# Patient Record
Sex: MALE | Race: WHITE | URBAN - METROPOLITAN AREA
[De-identification: names, ages, dates, MRNs, and addresses within clinical notes are randomized per-mention and may not be internally consistent; named-entity substitution may affect disease eponyms.]

---

## 2017-01-11 ENCOUNTER — TELEPHONE (OUTPATIENT)
Dept: SURGERY | Facility: CLINIC | Age: 42
End: 2017-01-11

## 2017-01-11 ENCOUNTER — DOCUMENTATION ONLY (OUTPATIENT)
Dept: SURGERY | Facility: CLINIC | Age: 42
End: 2017-01-11

## 2017-01-11 ENCOUNTER — OFFICE VISIT (OUTPATIENT)
Dept: SURGERY | Facility: CLINIC | Age: 42
End: 2017-01-11

## 2017-01-11 VITALS
HEART RATE: 84 BPM | HEIGHT: 77 IN | SYSTOLIC BLOOD PRESSURE: 112 MMHG | DIASTOLIC BLOOD PRESSURE: 76 MMHG | WEIGHT: 238.4 LBS | OXYGEN SATURATION: 98 % | BODY MASS INDEX: 28.15 KG/M2

## 2017-01-11 DIAGNOSIS — Z12.11 SCREENING FOR COLORECTAL CANCER: Primary | ICD-10-CM

## 2017-01-11 DIAGNOSIS — Z12.12 SCREENING FOR COLORECTAL CANCER: Primary | ICD-10-CM

## 2017-01-11 ASSESSMENT — PAIN SCALES - GENERAL: PAINLEVEL: NO PAIN (0)

## 2017-01-11 NOTE — TELEPHONE ENCOUNTER
Per task, I called Justin and set up a follow up appointment with lab appointment. I confirmed time, date, location, prep and provider.

## 2017-01-11 NOTE — Clinical Note
2017       RE: Justin Cortez  [address]   [facility] [address]     Dear Colleague,    Thank you for referring your patient, Justin Cortez, to the COLON AND RECTAL SURGERY at [facility]. Please see a copy of my visit note below.    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  Given these findings they were subsequently sent to the Colon and Rectal Surgery Clinic for an opinion on this and a new patient consultation.     History of Present Ilness: The patient is overall a good historian but we do not have formal records at the time of this visit. The history that follows is therefore entirely from the patient. Patient underwent a 2 stage total proctocolectomy with ileal pouch anal anastomosis in . This was done at [facility] by Dr. Mota. He did well following this and overall reports very good function. He may have had a pouchoscopy once in the past 17 years and saw a gastrointestinal doctor about 8 years ago in [place] once, as well. But overall he has not had much in the way of medical care. He has about 6 bowel movements daily and about once nightly. He has some fecal incontinence (a smear) about once or twice per week overnight. He takes no imodium, has had no pouchitis, no small bowel obstruction, and overall feels well. He denies skin, eye, and joint manifestations. He is unsure if there was ever any bile duct issues. He states that about a week ago he had noted a small amount of tissue - possibly a polyp or lump outside the anus. He believes that this might be  better now. He states that infrequently (about monthly) he will have a small amount blood in the bowel movement but then this will be self-limited.     PLEASE SEE NOTE BELOW FOR PHYSICAL EXAMINATION, REVIEW OF SYSTEMS, AND OTHER HISTORY.      Assessment/Plan:  41 year old male with a significant past medical history of ulcerative colitis and restorative total proctocolectomy with ileal pouch anal anastomosis here for concerns of a potential anal polyp and need for pelvic pouch surveillance.     1- We discussed surveillance options. He needs a flexible pouchoscopy as a baseline and an examination of the anal area. We would plan to take biopsies of any lesions and the anal transition zone. I offered this to him today in clinic. He has to leave to get to another appointment for his daughter. We will reschedule him for this and he will administer enemas prior to arrival.    2- We will check a complete blood count and complete metabolic panel to assess for anemia (he is at risk with no colon for Vitamin B12 deficiency and resultant anemia) and any liver function test abnormalities especially since we do not have an understanding if primary sclerosing cholangitis was ever an issue and there are increased risks of colorectal dysplasia and need for monitoring.    3 - Bowel function and mild fecal incontinence. He will try some intermittent imodium for this. He has not tried this and it may help with sleeping and avoiding the fecal incontinence in the night time.    4 - We will obtain his outside records and get them into Epic to have more complete information about him.    Total time was 30 minutes, over 50% was spent counseling the patient.     Total time was 30 minutes, over 50% was spent counseling the patient.     PLEASE SEE NOTE BELOW FOR PHYSICAL EXAMINATION, REVIEW OF SYSTEMS, AND OTHER HISTORY.    Zeny Rodriguez MD  Colon and Rectal Surgery Attending  Department of Surgery  [facility]  [facility]      -------------------------------------------------------------------------------------------------------------------    Review of Systems     Constitutional:  Negative for fever, chills, weight loss, weight gain, fatigue, decreased appetite, night sweats, recent stressors, height gain, height loss, post-operative complications, incisional pain, hallucinations, increased energy, hyperactivity and confused.   HENT:  Negative for ear pain, hearing loss, tinnitus, nosebleeds, trouble swallowing, hoarse voice, mouth sores, sore throat, ear discharge, tooth pain, gum tenderness, taste disturbance, smell disturbance, hearing aid, bleeding gums, dry mouth, sinus pain, sinus congestion and neck mass.    Eyes:  Negative for double vision, pain, redness, eye pain, decreased vision, eye watering, eye bulging, eye dryness, flashing lights, spots, floaters, strabismus, tunnel vision, jaundice and eye irritation.   Respiratory:   Negative for cough, hemoptysis, sputum production, shortness of breath, wheezing, sleep disturbances due to breathing, snores loudly, respiratory pain, dyspnea on exertion, cough disturbing sleep and postural dyspnea.    Cardiovascular:  Negative for chest pain, dyspnea on exertion, palpitations, orthopnea, claudication, leg swelling, fingers/toes turn blue, hypertension, hypotension, syncope, history of heart murmur, chest pain on exertion, chest pain at rest, pacemaker, few scattered varicosities, leg pain, sleep disturbances due to breathing, tachycardia, light-headedness, exercise intolerance and edema.   Gastrointestinal:  Negative for heartburn, nausea, vomiting, abdominal pain, diarrhea, constipation, blood in stool, melena, rectal pain, bloating, hemorrhoids, bowel incontinence, jaundice, rectal bleeding, coffee ground emesis and change in stool.   Genitourinary:  Negative for bladder incontinence, dysuria, urgency, hematuria, flank pain, difficulty urinating, nocturia, voiding  less frequently, scrotal pain, ulcerations, penile discharge, male genitourinary complaint and reduced libido.   Musculoskeletal:  Negative for myalgias, back pain, joint swelling, arthralgias, stiffness, muscle cramps, neck pain, bone pain, muscle weakness and fracture.   Skin:  Negative for nail changes, itching, poor wound healing, rash, hair changes, skin changes, acne, warts, poor wound healing, scarring, flaky skin, Raynaud's phenomenon, sensitivity to sunlight and skin thickening.   Neurological:  Negative for dizziness, tingling, tremors, speech change, seizures, loss of consciousness, weakness, light-headedness, numbness, headaches, disturbances in coordination, extremity numbness, memory loss, difficulty walking and paralysis.   Endo/Heme:  Negative for anemia, swollen glands and bruises/bleeds easily.   Psychiatric/Behavioral:  Negative for depression, hallucinations, memory loss, decreased concentration, mood swings and panic attacks.    Endocrine:  Negative for altered temperature regulation, polyphagia, polydipsia, unwanted hair growth and change in facial hair.          Medical history:  Past Medical History   Diagnosis Date     Obstructive sleep apnea      Ulcerative colitis (H)      treated with TPC with IPAA in 2000       Surgical history:  Past Surgical History   Procedure Laterality Date     Total proctocolectomy with ileal pouch anal anastomosis with pelvic ileal pouch.  2000       Problem list:    Patient Active Problem List    Diagnosis Date Noted     PETE (obstructive sleep apnea) 07/10/2015     Priority: Medium     very mild, positional PETE, overall AHI 5.3/hr         Medications:  Current Outpatient Prescriptions   Medication Sig Dispense Refill     Sertraline HCl (ZOLOFT PO) Take 100 mg by mouth daily         Allergies:  No Known Allergies    Family history:  Family History   Problem Relation Age of Onset     DIABETES No family hx of      Hypertension No family hx of      Coronary Artery  "Disease No family hx of      CEREBROVASCULAR DISEASE No family hx of        Social history:  Social History     Social History     Marital Status:      Spouse Name: N/A     Number of Children: N/A     Years of Education: N/A     Occupational History     Not on file.     Social History Main Topics     Smoking status: Never Smoker      Smokeless tobacco: Not on file     Alcohol Use: Yes      Comment: a few times per year     Drug Use: No     Sexual Activity: Not on file     Other Topics Concern     Not on file     Social History Narrative         Nursing Notes:   Avni Lentz CMA  1/11/2017  8:15 AM  Signed  Chief Complaint   Patient presents with     Consult     Rectal Polyps       Filed Vitals:    01/11/17 0812   BP: 112/76   Pulse: 84   Height: 6' 5\"   Weight: 238 lb 6.4 oz   SpO2: 98%       Body mass index is 28.26 kg/(m^2).      LILLIANA Mims Barbara A, RN  1/11/2017  9:23 AM  Signed  Pt will have CMP, CBC when he comes in again for flexible pouchoscopy.  Shavon will call pt to schedule.    Alva SMITH RNCC     Physical Examination:  /76 mmHg  Pulse 84  Ht 6' 5\"  Wt 238 lb 6.4 oz  BMI 28.26 kg/m2  SpO2 98%  General: Pleasant, no acute distress   HEENT: Anicteric sclerae, mucous membranes moist, neck supple.   Abdomen: Soft, nontender, nondistended. No hepatosplenomegaly, no masses. Intact midline and right lower quadrant ileostomy takedown site fascia.  Extremities: No edema in bilateral lower extremities   Perianal external examination:  Deferred (see assessment and plan).    Again, thank you for allowing me to participate in the care of your patient.      Sincerely,    Zeny Rodriguez MD      "

## 2017-01-11 NOTE — NURSING NOTE
Pt will have CMP, CBC when he comes in again for flexible pouchoscopy.  Shavon will call pt to schedule.    Alva SMITH RNCC

## 2017-01-11 NOTE — PROGRESS NOTES
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  Given these findings they were subsequently sent to the Colon and Rectal Surgery Clinic for an opinion on this and a new patient consultation.     History of Present Ilness: The patient is overall a good historian but we do not have formal records at the time of this visit. The history that follows is therefore entirely from the patient. Patient underwent a 2 stage total proctocolectomy with ileal pouch anal anastomosis in . This was done at [facility] by Dr. Mota. He did well following this and overall reports very good function. He may have had a pouchoscopy once in the past 17 years and saw a gastrointestinal doctor about 8 years ago in [place] once, as well. But overall he has not had much in the way of medical care. He has about 6 bowel movements daily and about once nightly. He has some fecal incontinence (a smear) about once or twice per week overnight. He takes no imodium, has had no pouchitis, no small bowel obstruction, and overall feels well. He denies skin, eye, and joint manifestations. He is unsure if there was ever any bile duct issues. He states that about a week ago he had noted a small amount of tissue - possibly a polyp or lump outside the anus. He believes that this might be better now. He states that infrequently (about monthly) he will have a small amount blood in the bowel movement but then this will be self-limited.     PLEASE SEE NOTE BELOW FOR PHYSICAL EXAMINATION, REVIEW OF SYSTEMS, AND OTHER HISTORY.      Assessment/Plan:  41 year old male with  a significant past medical history of ulcerative colitis and restorative total proctocolectomy with ileal pouch anal anastomosis here for concerns of a potential anal polyp and need for pelvic pouch surveillance.     1- We discussed surveillance options. He needs a flexible pouchoscopy as a baseline and an examination of the anal area. We would plan to take biopsies of any lesions and the anal transition zone. I offered this to him today in clinic. He has to leave to get to another appointment for his daughter. We will reschedule him for this and he will administer enemas prior to arrival.    2- We will check a complete blood count and complete metabolic panel to assess for anemia (he is at risk with no colon for Vitamin B12 deficiency and resultant anemia) and any liver function test abnormalities especially since we do not have an understanding if primary sclerosing cholangitis was ever an issue and there are increased risks of colorectal dysplasia and need for monitoring.    3 - Bowel function and mild fecal incontinence. He will try some intermittent imodium for this. He has not tried this and it may help with sleeping and avoiding the fecal incontinence in the night time.    4 - We will obtain his outside records and get them into Epic to have more complete information about him.    Total time was 30 minutes, over 50% was spent counseling the patient.     Total time was 30 minutes, over 50% was spent counseling the patient.     PLEASE SEE NOTE BELOW FOR PHYSICAL EXAMINATION, REVIEW OF SYSTEMS, AND OTHER HISTORY.    Zeny Rodriguez MD  Colon and Rectal Surgery Attending  Department of Surgery  [facility]      -------------------------------------------------------------------------------------------------------------------    Review of Systems     Constitutional:  Negative for fever, chills, weight loss, weight gain, fatigue, decreased appetite, night sweats, recent  stressors, height gain, height loss, post-operative complications, incisional pain, hallucinations, increased energy, hyperactivity and confused.   HENT:  Negative for ear pain, hearing loss, tinnitus, nosebleeds, trouble swallowing, hoarse voice, mouth sores, sore throat, ear discharge, tooth pain, gum tenderness, taste disturbance, smell disturbance, hearing aid, bleeding gums, dry mouth, sinus pain, sinus congestion and neck mass.    Eyes:  Negative for double vision, pain, redness, eye pain, decreased vision, eye watering, eye bulging, eye dryness, flashing lights, spots, floaters, strabismus, tunnel vision, jaundice and eye irritation.   Respiratory:   Negative for cough, hemoptysis, sputum production, shortness of breath, wheezing, sleep disturbances due to breathing, snores loudly, respiratory pain, dyspnea on exertion, cough disturbing sleep and postural dyspnea.    Cardiovascular:  Negative for chest pain, dyspnea on exertion, palpitations, orthopnea, claudication, leg swelling, fingers/toes turn blue, hypertension, hypotension, syncope, history of heart murmur, chest pain on exertion, chest pain at rest, pacemaker, few scattered varicosities, leg pain, sleep disturbances due to breathing, tachycardia, light-headedness, exercise intolerance and edema.   Gastrointestinal:  Negative for heartburn, nausea, vomiting, abdominal pain, diarrhea, constipation, blood in stool, melena, rectal pain, bloating, hemorrhoids, bowel incontinence, jaundice, rectal bleeding, coffee ground emesis and change in stool.   Genitourinary:  Negative for bladder incontinence, dysuria, urgency, hematuria, flank pain, difficulty urinating, nocturia, voiding less frequently, scrotal pain, ulcerations, penile discharge, male genitourinary complaint and reduced libido.   Musculoskeletal:  Negative for myalgias, back pain, joint swelling, arthralgias, stiffness, muscle cramps, neck pain, bone pain, muscle weakness and fracture.   Skin:   Negative for nail changes, itching, poor wound healing, rash, hair changes, skin changes, acne, warts, poor wound healing, scarring, flaky skin, Raynaud's phenomenon, sensitivity to sunlight and skin thickening.   Neurological:  Negative for dizziness, tingling, tremors, speech change, seizures, loss of consciousness, weakness, light-headedness, numbness, headaches, disturbances in coordination, extremity numbness, memory loss, difficulty walking and paralysis.   Endo/Heme:  Negative for anemia, swollen glands and bruises/bleeds easily.   Psychiatric/Behavioral:  Negative for depression, hallucinations, memory loss, decreased concentration, mood swings and panic attacks.    Endocrine:  Negative for altered temperature regulation, polyphagia, polydipsia, unwanted hair growth and change in facial hair.          Medical history:  Past Medical History   Diagnosis Date     Obstructive sleep apnea      Ulcerative colitis (H)      treated with TPC with IPAA in 2000       Surgical history:  Past Surgical History   Procedure Laterality Date     Total proctocolectomy with ileal pouch anal anastomosis with pelvic ileal pouch.  2000       Problem list:    Patient Active Problem List    Diagnosis Date Noted     PETE (obstructive sleep apnea) 07/10/2015     Priority: Medium     very mild, positional PETE, overall AHI 5.3/hr         Medications:  Current Outpatient Prescriptions   Medication Sig Dispense Refill     Sertraline HCl (ZOLOFT PO) Take 100 mg by mouth daily         Allergies:  No Known Allergies    Family history:  Family History   Problem Relation Age of Onset     DIABETES No family hx of      Hypertension No family hx of      Coronary Artery Disease No family hx of      CEREBROVASCULAR DISEASE No family hx of        Social history:  Social History     Social History     Marital Status:      Spouse Name: N/A     Number of Children: N/A     Years of Education: N/A     Occupational History     Not on file.  "    Social History Main Topics     Smoking status: Never Smoker      Smokeless tobacco: Not on file     Alcohol Use: Yes      Comment: a few times per year     Drug Use: No     Sexual Activity: Not on file     Other Topics Concern     Not on file     Social History Narrative         Nursing Notes:   Avni Lentz CMA  1/11/2017  8:15 AM  Signed  Chief Complaint   Patient presents with     Consult     Rectal Polyps       Filed Vitals:    01/11/17 0812   BP: 112/76   Pulse: 84   Height: 6' 5\"   Weight: 238 lb 6.4 oz   SpO2: 98%       Body mass index is 28.26 kg/(m^2).      LILLIANA Mims Barbara A, RN  1/11/2017  9:23 AM  Signed  Pt will have CMP, CBC when he comes in again for flexible pouchoscopy.  Shavon will call pt to schedule.    Alva SMITH RNCC     Physical Examination:  /76 mmHg  Pulse 84  Ht 6' 5\"  Wt 238 lb 6.4 oz  BMI 28.26 kg/m2  SpO2 98%  General: Pleasant, no acute distress   HEENT: Anicteric sclerae, mucous membranes moist, neck supple.   Abdomen: Soft, nontender, nondistended. No hepatosplenomegaly, no masses. Intact midline and right lower quadrant ileostomy takedown site fascia.  Extremities: No edema in bilateral lower extremities   Perianal external examination:  Deferred (see assessment and plan).  "

## 2017-01-11 NOTE — Clinical Note
Date:January 16, 2017      Patient was self referred, no letter generated. Do not send.        [facility] Information

## 2017-01-11 NOTE — NURSING NOTE
"Chief Complaint   Patient presents with     Consult     Rectal Polyps       Filed Vitals:    01/11/17 0812   BP: 112/76   Pulse: 84   Height: 6' 5\"   Weight: 238 lb 6.4 oz   SpO2: 98%       Body mass index is 28.26 kg/(m^2).      Anvi Lentz CMA                              "

## 2017-01-12 ENCOUNTER — DOCUMENTATION ONLY (OUTPATIENT)
Dept: SURGERY | Facility: CLINIC | Age: 42
End: 2017-01-12

## 2017-01-15 ASSESSMENT — ENCOUNTER SYMPTOMS
JAUNDICE: 0
PANIC: 0
CONSTIPATION: 0
WEAKNESS: 0
LIGHT-HEADEDNESS: 0
NECK PAIN: 0
EYE WATERING: 0
DISTURBANCES IN COORDINATION: 0
SNORES LOUDLY: 0
TREMORS: 0
BRUISES/BLEEDS EASILY: 0
DEPRESSION: 0
RECTAL BLEEDING: 0
HOARSE VOICE: 0
HALLUCINATIONS: 0
MUSCLE WEAKNESS: 0
NUMBNESS: 0
LEG PAIN: 0
DYSPNEA ON EXERTION: 0
DIZZINESS: 0
PALPITATIONS: 0
SYNCOPE: 0
TINGLING: 0
SEIZURES: 0
POSTURAL DYSPNEA: 0
HEADACHES: 0
RESPIRATORY PAIN: 0
DIARRHEA: 0
COUGH DISTURBING SLEEP: 0
SINUS CONGESTION: 0
BACK PAIN: 0
TASTE DISTURBANCE: 0
SORE THROAT: 0
SHORTNESS OF BREATH: 0
RECTAL PAIN: 0
LOSS OF CONSCIOUSNESS: 0
INSOMNIA: 0
SPUTUM PRODUCTION: 0
SWOLLEN GLANDS: 0
DYSURIA: 0
FLANK PAIN: 0
BOWEL INCONTINENCE: 0
NECK MASS: 0
SINUS PAIN: 0
STIFFNESS: 0
NERVOUS/ANXIOUS: 0
DIFFICULTY URINATING: 0
BLOATING: 0
EYE IRRITATION: 0
MYALGIAS: 0
COUGH: 0
LEG SWELLING: 0
WHEEZING: 0
CLAUDICATION: 0
ORTHOPNEA: 0
ALTERED TEMPERATURE REGULATION: 0
EYE PAIN: 0
NIGHT SWEATS: 0
DECREASED APPETITE: 0
MEMORY LOSS: 0
SLEEP DISTURBANCES DUE TO BREATHING: 0
HEMOPTYSIS: 0
EXTREMITY NUMBNESS: 0
ABDOMINAL PAIN: 0
MUSCLE CRAMPS: 0
POLYDIPSIA: 0
HYPOTENSION: 0
POOR WOUND HEALING: 0
WEIGHT GAIN: 0
FATIGUE: 0
ARTHRALGIAS: 0
PARALYSIS: 0
JOINT SWELLING: 0
HEARTBURN: 0
SMELL DISTURBANCE: 0
INCREASED ENERGY: 0
BLOOD IN STOOL: 0
HYPERTENSION: 0
NAIL CHANGES: 0
EYE REDNESS: 0
FEVER: 0
HEMATURIA: 0
TROUBLE SWALLOWING: 0
WEIGHT LOSS: 0
EXERCISE INTOLERANCE: 0
DECREASED CONCENTRATION: 0
DOUBLE VISION: 0
POLYPHAGIA: 0
TACHYCARDIA: 0
VOMITING: 0
CHILLS: 0
SPEECH CHANGE: 0
SKIN CHANGES: 0
NAUSEA: 0

## 2017-02-15 ENCOUNTER — OFFICE VISIT (OUTPATIENT)
Dept: SURGERY | Facility: CLINIC | Age: 42
End: 2017-02-15

## 2017-02-15 VITALS
OXYGEN SATURATION: 100 % | DIASTOLIC BLOOD PRESSURE: 68 MMHG | BODY MASS INDEX: 28.15 KG/M2 | WEIGHT: 238.4 LBS | HEIGHT: 77 IN | HEART RATE: 80 BPM | SYSTOLIC BLOOD PRESSURE: 119 MMHG | TEMPERATURE: 98.3 F

## 2017-02-15 DIAGNOSIS — Z12.12 SCREENING FOR COLORECTAL CANCER: ICD-10-CM

## 2017-02-15 DIAGNOSIS — Z12.11 SCREENING FOR COLORECTAL CANCER: ICD-10-CM

## 2017-02-15 DIAGNOSIS — K51.20 ULCERATIVE PROCTITIS WITHOUT COMPLICATION (H): Primary | ICD-10-CM

## 2017-02-15 LAB
ALBUMIN SERPL-MCNC: 3.9 G/DL (ref 3.4–5)
ALP SERPL-CCNC: 77 U/L (ref 40–150)
ALT SERPL W P-5'-P-CCNC: 28 U/L (ref 0–70)
ANION GAP SERPL CALCULATED.3IONS-SCNC: 8 MMOL/L (ref 3–14)
AST SERPL W P-5'-P-CCNC: 14 U/L (ref 0–45)
BILIRUB SERPL-MCNC: 0.4 MG/DL (ref 0.2–1.3)
BUN SERPL-MCNC: 22 MG/DL (ref 7–30)
CALCIUM SERPL-MCNC: 8.7 MG/DL (ref 8.5–10.1)
CHLORIDE SERPL-SCNC: 105 MMOL/L (ref 94–109)
CO2 SERPL-SCNC: 29 MMOL/L (ref 20–32)
CREAT SERPL-MCNC: 1.29 MG/DL (ref 0.66–1.25)
ERYTHROCYTE [DISTWIDTH] IN BLOOD BY AUTOMATED COUNT: 12.8 % (ref 10–15)
GFR SERPL CREATININE-BSD FRML MDRD: 62 ML/MIN/1.7M2
GLUCOSE SERPL-MCNC: 107 MG/DL (ref 70–99)
HCT VFR BLD AUTO: 47.7 % (ref 40–53)
HGB BLD-MCNC: 15.6 G/DL (ref 13.3–17.7)
MCH RBC QN AUTO: 29.2 PG (ref 26.5–33)
MCHC RBC AUTO-ENTMCNC: 32.7 G/DL (ref 31.5–36.5)
MCV RBC AUTO: 89 FL (ref 78–100)
PLATELET # BLD AUTO: 240 10E9/L (ref 150–450)
POTASSIUM SERPL-SCNC: 4.2 MMOL/L (ref 3.4–5.3)
PROT SERPL-MCNC: 7.5 G/DL (ref 6.8–8.8)
RBC # BLD AUTO: 5.34 10E12/L (ref 4.4–5.9)
SODIUM SERPL-SCNC: 142 MMOL/L (ref 133–144)
WBC # BLD AUTO: 7 10E9/L (ref 4–11)

## 2017-02-15 ASSESSMENT — PAIN SCALES - GENERAL: PAINLEVEL: NO PAIN (0)

## 2017-02-15 NOTE — PROGRESS NOTES
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 I saw him in 2017 and at that time had a \"lump\" at the perianal area.    History:   1. 2 stage total proctocolectomy with ileal pouch anal anastomosis in . This was done at [facility] by Dr. Mota.   2. Did well following with overall reports very good function.   3. Had a pouchoscopy once in early .  4. He denies skin, eye, and joint manifestations. No bile duct issues.     Interim history: He has about 6 bowel movements daily and about once nightly. He has used imodium with some success. He was seen a month ago and had a lump near the anal verge which resulted. Infrequently (about monthly) he will have a small amount blood in the bowel movement but then this will be self-limited. He is anxious about getting the flexible pouchoscopy today and did not do an enema.    PLEASE SEE NOTE BELOW FOR PHYSICAL EXAMINATION, REVIEW OF SYSTEMS, AND OTHER HISTORY.      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    1- Surveillance today with flexible pouchoscopy. We biopsied his pouch and the anal transition zone. We will let him know what this shows and likely recommend repeat in 1 to 2 years. Overall, everything appears health and normal.     2- Bowel function and mild fecal incontinence. He will continue some intermittent imodium for this.      Total time was 25 minutes, over 50% was spent counseling the patient.     PLEASE SEE NOTE BELOW FOR PHYSICAL " EXAMINATION, REVIEW OF SYSTEMS, AND OTHER HISTORY.    Zeny Rodriguez MD  Colon and Rectal Surgery Attending  Department of Surgery  [facility]      -------------------------------------------------------------------------------------------------------------------    ROS Not performed        Medical history:  Past Medical History   Diagnosis Date     Obstructive sleep apnea      Ulcerative colitis (H)      treated with TPC with IPAA in 2000       Surgical history:  Past Surgical History   Procedure Laterality Date     Total proctocolectomy with ileal pouch anal anastomosis with pelvic ileal pouch.  2000       Problem list:    Patient Active Problem List    Diagnosis Date Noted     PETE (obstructive sleep apnea) 07/10/2015     Priority: Medium     very mild, positional PETE, overall AHI 5.3/hr         Medications:  Current Outpatient Prescriptions   Medication Sig Dispense Refill     Sertraline HCl (ZOLOFT PO) Take 100 mg by mouth daily         Allergies:  No Known Allergies    Family history:  Family History   Problem Relation Age of Onset     DIABETES No family hx of      Hypertension No family hx of      Coronary Artery Disease No family hx of      CEREBROVASCULAR DISEASE No family hx of        Social history:  Social History     Social History     Marital status:      Spouse name: N/A     Number of children: N/A     Years of education: N/A     Occupational History     Not on file.     Social History Main Topics     Smoking status: Never Smoker     Smokeless tobacco: Not on file     Alcohol use Yes      Comment: a few times per year     Drug use: No     Sexual activity: Not on file     Other Topics Concern     Not on file     Social History Narrative         Nursing Notes:   Brittany Doss LPN  2/15/2017  8:12 AM  Signed  Chief Complaint   Patient presents with     Flexible Sigmoidoscopy       Vitals:    02/15/17 0810   BP: 119/68   Pulse: 80   Temp: 98.3  F (36.8  C)  "  TempSrc: Oral   SpO2: 100%   Weight: 238 lb 6.4 oz   Height: 6' 5\"       Body mass index is 28.27 kg/(m^2).    Brittany RIOS LPN                     Physical Examination:  /68  Pulse 80  Temp 98.3  F (36.8  C) (Oral)  Ht 6' 5\"  Wt 238 lb 6.4 oz  SpO2 100%  BMI 28.27 kg/m2  General: Pleasant, no acute distress   HEENT: Anicteric sclerae, mucous membranes moist, neck supple.     Perianal external examination:  Minimal perianal excoriation. No significant external hemorrhoids.     Digital rectal examination: Good sphincter tone, prostate normal. ileal pouch anal anastomosis is open.     Flexible pouchoscopy: After informed consent obtained, flexible pouchoscopy was performed. The pouch overall was healthy appearing with possibly a small amount of subtle mucosal inflammation. Biopsies of the body of the pouch were taken. We then took multiple biopsies of the anal transition zone, which overall was healthy in appearance. The patient tolerated the procedure well.   "

## 2017-02-15 NOTE — LETTER
"2/15/2017       RE: Justin Cortez  [address]   [place] [facility] [address]     Dear Colleague,    Thank you for referring your patient, Justin Cortez, to the [facility] COLON AND RECTAL SURGERY at [facility]. Please see a copy of my visit note below.    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 I saw him in 2017 and at that time had a \"lump\" at the perianal area.    History:   1. 2 stage total proctocolectomy with ileal pouch anal anastomosis in . This was done at [facility] by Dr. Mota.   2. Did well following with overall reports very good function.   3. Had a pouchoscopy once in early .  4. He denies skin, eye, and joint manifestations. No bile duct issues.     Interim history: He has about 6 bowel movements daily and about once nightly. He has used imodium with some success. He was seen a month ago and had a lump near the anal verge which resulted. Infrequently (about monthly) he will have a small amount blood in the bowel movement but then this will be self-limited. He is anxious about getting the flexible pouchoscopy today and did not do an enema.    PLEASE SEE NOTE BELOW FOR PHYSICAL EXAMINATION, REVIEW OF SYSTEMS, AND OTHER HISTORY.      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    1- Surveillance today with flexible pouchoscopy. We biopsied his pouch and the anal transition zone. We will let him know what this shows and likely recommend " repeat in 1 to 2 years. Overall, everything appears health and normal.     2- Bowel function and mild fecal incontinence. He will continue some intermittent imodium for this.      Total time was 25 minutes, over 50% was spent counseling the patient.     PLEASE SEE NOTE BELOW FOR PHYSICAL EXAMINATION, REVIEW OF SYSTEMS, AND OTHER HISTORY.    Zeny Rodriguez MD  Colon and Rectal Surgery Attending  Department of Surgery  [facility]      -------------------------------------------------------------------------------------------------------------------    ROS Not performed        Medical history:  Past Medical History   Diagnosis Date     Obstructive sleep apnea      Ulcerative colitis (H)      treated with TPC with IPAA in 2000       Surgical history:  Past Surgical History   Procedure Laterality Date     Total proctocolectomy with ileal pouch anal anastomosis with pelvic ileal pouch.  2000       Problem list:    Patient Active Problem List    Diagnosis Date Noted     PETE (obstructive sleep apnea) 07/10/2015     Priority: Medium     very mild, positional PETE, overall AHI 5.3/hr         Medications:  Current Outpatient Prescriptions   Medication Sig Dispense Refill     Sertraline HCl (ZOLOFT PO) Take 100 mg by mouth daily         Allergies:  No Known Allergies    Family history:  Family History   Problem Relation Age of Onset     DIABETES No family hx of      Hypertension No family hx of      Coronary Artery Disease No family hx of      CEREBROVASCULAR DISEASE No family hx of        Social history:  Social History     Social History     Marital status:      Spouse name: N/A     Number of children: N/A     Years of education: N/A     Occupational History     Not on file.     Social History Main Topics     Smoking status: Never Smoker     Smokeless tobacco: Not on file     Alcohol use Yes      Comment: a few times per year     Drug use: No     Sexual activity: Not on file  "    Other Topics Concern     Not on file     Social History Narrative         Nursing Notes:   Brittany Doss LPN  2/15/2017  8:12 AM  Signed  Chief Complaint   Patient presents with     Flexible Sigmoidoscopy       Vitals:    02/15/17 0810   BP: 119/68   Pulse: 80   Temp: 98.3  F (36.8  C)   TempSrc: Oral   SpO2: 100%   Weight: 238 lb 6.4 oz   Height: 6' 5\"       Body mass index is 28.27 kg/(m^2).    Brittany RIOS LPN                     Physical Examination:  /68  Pulse 80  Temp 98.3  F (36.8  C) (Oral)  Ht 6' 5\"  Wt 238 lb 6.4 oz  SpO2 100%  BMI 28.27 kg/m2  General: Pleasant, no acute distress   HEENT: Anicteric sclerae, mucous membranes moist, neck supple.     Perianal external examination:  Minimal perianal excoriation. No significant external hemorrhoids.     Digital rectal examination: Good sphincter tone, prostate normal. ileal pouch anal anastomosis is open.     Flexible pouchoscopy: After informed consent obtained, flexible pouchoscopy was performed. The pouch overall was healthy appearing with possibly a small amount of subtle mucosal inflammation. Biopsies of the body of the pouch were taken. We then took multiple biopsies of the anal transition zone, which overall was healthy in appearance. The patient tolerated the procedure well.     Again, thank you for allowing me to participate in the care of your patient.      Sincerely,    Zeny Rodriguez MD      "

## 2017-02-15 NOTE — NURSING NOTE
"Chief Complaint   Patient presents with     Flexible Sigmoidoscopy       Vitals:    02/15/17 0810   BP: 119/68   Pulse: 80   Temp: 98.3  F (36.8  C)   TempSrc: Oral   SpO2: 100%   Weight: 238 lb 6.4 oz   Height: 6' 5\"       Body mass index is 28.27 kg/(m^2).    Brittany RIOS LPN                  "

## 2017-02-15 NOTE — LETTER
Date:February 20, 2017      Patient was self referred, no letter generated. Do not send.        [facility] Health Information

## 2017-02-15 NOTE — MR AVS SNAPSHOT
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)    -  1       Follow-ups after your visit        Who to contact     Please call your clinic at [phone] to:    Ask questions about your health    Make or cancel appointments    Discuss your medicines    Learn about your test results    Speak to your doctor   If you have compliments or concerns about an experience at your clinic, or if you wish to file a complaint, please contact [facility] Patient Relations at [phone] or email us at [email or web address]         Additional Information About Your Visit        MyChart Information     [employer] is an electronic gateway that provides easy, online access to your medical records. With [employer], you can request a clinic appointment, read your test results, renew a prescription or communicate with your care team.     To sign up for [employer] visit the website at [email or web address]   You will be asked to enter the access code listed below, as well as some personal information. Please follow the directions to create your username and password.     Your access code is: [email or web address]  Expires: 3/28/2017  6:30 AM     Your access code will  in 90 days. If you need help or a new code, please contact your [facility] or call [phone] for assistance.        Care EveryWhere ID     This is your Care EveryWhere ID. This could be used by other organizations to access your [facility] medical records  [id number]        Your Vitals Were     Pulse Temperature Height Pulse Oximetry BMI (Body Mass Index)       80 98.3  F (36.8  C) (Oral) 6' 5\" " 100% 28.27 kg/m2        Blood Pressure from Last 3 Encounters:   02/15/17 119/68   01/11/17 112/76   07/10/15 113/71    Weight from Last 3 Encounters:   02/15/17 238 lb 6.4 oz   01/11/17 238 lb 6.4 oz   04/30/15 233 lb 12.8 oz              We Performed the Following     FLEX SIGMOIDOSCOPY W/WO SHERITA SPEC BY BRUSH/WASH     Surgical pathology exam        Primary Care Provider    None Specified       No primary provider on file.        Thank you!     Thank you for choosing [facility] COLON AND RECTAL SURGERY  for your care. Our goal is always to provide you with excellent care. Hearing back from our patients is one way we can continue to improve our services. Please take a few minutes to complete the written survey that you may receive in the mail after your visit with us. Thank you!             Your Updated Medication List - Protect others around you: Learn how to safely use, store and throw away your medicines at www.disposemymeds.org.          This list is accurate as of: 2/15/17 11:59 PM.  Always use your most recent med list.                   Brand Name Dispense Instructions for use    ZOLOFT PO      Take 100 mg by mouth daily

## 2017-02-16 ENCOUNTER — CARE COORDINATION (OUTPATIENT)
Dept: SURGERY | Facility: CLINIC | Age: 42
End: 2017-02-16

## 2017-02-16 LAB — COPATH REPORT: NORMAL

## 2017-02-16 NOTE — PROGRESS NOTES
Left  msg that path from yesterday came back with no dysplasia, no bad changes in the cells.  Let him know I would clarify with Dr. CHAVEZ when she would like him to repeat surveillance.

## 2017-02-17 ENCOUNTER — CARE COORDINATION (OUTPATIENT)
Dept: SURGERY | Facility: CLINIC | Age: 42
End: 2017-02-17

## 2017-02-17 NOTE — PROGRESS NOTES
Per Dr. CHAVEZ, pt needs repeat pouchoscopy in 2 yrs.  This information is left on his VM.  Pt is placed in recall system for 2/2019.

## 2017-03-10 ENCOUNTER — CARE COORDINATION (OUTPATIENT)
Dept: SURGERY | Facility: CLINIC | Age: 42
End: 2017-03-10

## 2017-03-10 NOTE — PROGRESS NOTES
Informed pt of slightly increased creatinine recently (1.29)--Dr. CHAVEZ recommends f/u on this with PCP at annual exam.  Pt states understanding that this value is just slightly elevated.

## 2018-12-20 ENCOUNTER — TELEPHONE (OUTPATIENT)
Dept: SURGERY | Facility: CLINIC | Age: 43
End: 2018-12-20

## 2018-12-20 NOTE — TELEPHONE ENCOUNTER
Called patient regarding recall 2 year for pouchoscopy with Dr. Rodriguez in clinic. Patient said he had a reminder and will be calling back to schedule in a month or two. He is aware of needing to come back. Decline to schedule at this time. He will call us back.     Brit PHELPS LPN

## 2020-03-10 ENCOUNTER — HEALTH MAINTENANCE LETTER (OUTPATIENT)
Age: 45
End: 2020-03-10

## 2020-12-20 ENCOUNTER — HEALTH MAINTENANCE LETTER (OUTPATIENT)
Age: 45
End: 2020-12-20

## 2021-04-24 ENCOUNTER — HEALTH MAINTENANCE LETTER (OUTPATIENT)
Age: 46
End: 2021-04-24

## 2021-08-30 ENCOUNTER — TELEPHONE (OUTPATIENT)
Dept: SURGERY | Facility: CLINIC | Age: 46
End: 2021-08-30

## 2021-08-30 NOTE — TELEPHONE ENCOUNTER
Appointment Information / Please Schedule This Appointment For:     With: Dr. Zeny Rodriguez     Referring Provider: self     For / Appt Notes: pouchoscopy     Appt Type: UMP Flex Sig     Appt Date/Time: next available

## 2021-10-03 ENCOUNTER — HEALTH MAINTENANCE LETTER (OUTPATIENT)
Age: 46
End: 2021-10-03

## 2022-01-25 NOTE — PROGRESS NOTES
Patient was scheduled for flexible pouchoscopy today. He did not do enemas prior to his appointment and would prefer to do these at home and reschedule the pouchoscopy for a different day in clinic.  Will cancel and reschedule visit.    MARY Bridges, NP-C  Colon and Rectal Surgery  [facility]    This encounter was opened in error. Please disregard.

## 2022-02-02 ENCOUNTER — OFFICE VISIT (OUTPATIENT)
Dept: SURGERY | Facility: CLINIC | Age: 47
End: 2022-02-02
Payer: COMMERCIAL

## 2022-02-02 VITALS
OXYGEN SATURATION: 99 % | BODY MASS INDEX: 28.57 KG/M2 | SYSTOLIC BLOOD PRESSURE: 128 MMHG | DIASTOLIC BLOOD PRESSURE: 67 MMHG | WEIGHT: 242 LBS | HEART RATE: 77 BPM | HEIGHT: 77 IN

## 2022-02-02 DIAGNOSIS — Z53.9 ERRONEOUS ENCOUNTER--DISREGARD: Primary | ICD-10-CM

## 2022-02-02 ASSESSMENT — PAIN SCALES - GENERAL: PAINLEVEL: NO PAIN (0)

## 2022-02-02 ASSESSMENT — MIFFLIN-ST. JEOR: SCORE: 2105.08

## 2022-02-02 NOTE — LETTER
2/2/2022       RE: Justin Cortez  [address]  [place] [address]     Dear Colleague,    Thank you for referring your patient, Justin Cortez, to the [facility] COLON AND RECTAL SURGERY CLINIC [place] [facility]. Please see a copy of my visit note below.    Patient was scheduled for flexible pouchoscopy today. He did not do enemas prior to his appointment and would prefer to do these at home and reschedule the pouchoscopy for a different day in clinic.  Will cancel and reschedule visit.    MARY Bridges, NP-C  Colon and Rectal Surgery  [facility]    This encounter was opened in error. Please disregard.      Again, thank you for allowing me to participate in the care of your patient.      Sincerely,    Zeny Rodriguez MD

## 2022-02-02 NOTE — NURSING NOTE
"Chief Complaint   Patient presents with     Flexible Sigmoidoscopy     Flexible pouchoscopy        Vitals:    02/02/22 1629   BP: 128/67   BP Location: Left arm   Patient Position: Sitting   Cuff Size: Adult Regular   Pulse: 77   SpO2: 99%   Weight: 242 lb   Height: 6' 5\"       Body mass index is 28.7 kg/m .    Mariana Ruiz CMA    "

## 2022-03-08 NOTE — PROGRESS NOTES
Colon and Rectal Surgery Consult Clinic Note    Referring provider:  No referring provider defined for this encounter.       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 Mota here for pelvic pouch surveillance. I saw him last on 2/15/2017.    Interval History:  Justin has been doing well. No issues with his pouch or bowel function. He feels like he has a hemorrhoid that bulges out with bowl movements but no pain or bleeding.    PLEASE SEE NOTE BELOW FOR PHYSICAL EXAMINATION, REVIEW OF SYSTEMS, AND OTHER HISTORY.    Assessment/Plan:    46 year old male with a significant past medical history of ulcerative colitis and restorative total proctocolectomy with ileal pouch anal anastomosis here for pelvic pouch surveillance. Surveillance today with flexible pouchoscopy. I biopsied the anal transition zone. We will let him know what this shows and likely recommend repeat in 1 to 2 years. Overall, everything appears health and normal.     5 minutes spent on the date of encounter (excluding time performing procedures with or without biopsy) performing chart review, history and exam, documentation and further activities as noted above with an additional 10 minutes for flexible sigmoidoscopy.    Zeny Rodriguez MD  Colon and Rectal Surgery Staff  [facility]      -------------------------------------------------------------------------------------------------------------------          Medical history:  Past Medical History:   Diagnosis Date     Obstructive sleep apnea      Ulcerative colitis (H)     treated with TPC with IPAA in        Surgical history:  Past Surgical History:   Procedure Laterality Date     Total proctocolectomy with ileal pouch anal anastomosis with pelvic  "ileal pouch.  2000       Problem list:    Patient Active Problem List    Diagnosis Date Noted     PETE (obstructive sleep apnea) 07/10/2015     Priority: Medium     very mild, positional PETE, overall AHI 5.3/hr         Medications:  Current Outpatient Medications   Medication Sig Dispense Refill     Sertraline HCl (ZOLOFT PO) Take 100 mg by mouth daily         Allergies:  No Known Allergies    Family history:  Family History   Problem Relation Age of Onset     Diabetes No family hx of      Hypertension No family hx of      Coronary Artery Disease No family hx of      Cerebrovascular Disease No family hx of        Social history:  Social History     Socioeconomic History     Marital status:      Spouse name: Not on file     Number of children: Not on file     Years of education: Not on file     Highest education level: Not on file   Occupational History     Not on file   Tobacco Use     Smoking status: Never Smoker     Smokeless tobacco: Never Used   Substance and Sexual Activity     Alcohol use: Yes     Comment: a few times per year     Drug use: No     Sexual activity: Not on file   Other Topics Concern     Not on file   Social History Narrative     Not on file     Social Determinants of Health     Financial Resource Strain: Not on file   Food Insecurity: Not on file   Transportation Needs: Not on file   Physical Activity: Not on file   Stress: Not on file   Social Connections: Not on file   Intimate Partner Violence: Not on file   Housing Stability: Not on file         Nursing Notes:   Mariana Ruiz  3/9/2022  3:26 PM  Signed  Chief Complaint   Patient presents with     Flexible Sigmoidoscopy       Vitals:    03/09/22 1523   BP: 123/75   BP Location: Left arm   Patient Position: Sitting   Cuff Size: Adult Regular   Pulse: 72   SpO2: 99%   Weight: 237 lb 9.6 oz   Height: 6' 5\"       Body mass index is 28.18 kg/m .    Mariana Ruiz CMA         Physical Examination:  /75 (BP Location: Left arm, " "Patient Position: Sitting, Cuff Size: Adult Regular)   Pulse 72   Ht 1.956 m (6' 5\")   Wt 107.8 kg (237 lb 9.6 oz)   SpO2 99%   BMI 28.18 kg/m    General: Alert, oriented, in no acute distress    Perianal external examination:  Small skin tag in the posterior position     Digital rectal examination: Good sphincter tone, prostate normal. Ileal pouch anal anastomosis is open.      Flexible pouchoscopy: After informed consent obtained, flexible pouchoscopy was performed. The pouch overall was healthy appearing with possibly a small amount of subtle mucosal inflammation. I took several biopsies of the anal transition zone, which overall was healthy in appearance. He has what looks like a hypertrophied anal papilla that may prolapse out but no obvious internal hemorrhoids. The patient tolerated the procedure well.         "

## 2022-03-09 ENCOUNTER — OFFICE VISIT (OUTPATIENT)
Dept: SURGERY | Facility: CLINIC | Age: 47
End: 2022-03-09
Payer: COMMERCIAL

## 2022-03-09 VITALS
SYSTOLIC BLOOD PRESSURE: 123 MMHG | DIASTOLIC BLOOD PRESSURE: 75 MMHG | HEART RATE: 72 BPM | OXYGEN SATURATION: 99 % | BODY MASS INDEX: 28.05 KG/M2 | WEIGHT: 237.6 LBS | HEIGHT: 77 IN

## 2022-03-09 DIAGNOSIS — K51.018 ULCERATIVE PANCOLITIS WITH OTHER COMPLICATION (H): Primary | ICD-10-CM

## 2022-03-09 PROCEDURE — 45331 SIGMOIDOSCOPY AND BIOPSY: CPT | Performed by: COLON & RECTAL SURGERY

## 2022-03-09 PROCEDURE — 88305 TISSUE EXAM BY PATHOLOGIST: CPT | Mod: TC | Performed by: COLON & RECTAL SURGERY

## 2022-03-09 PROCEDURE — 88305 TISSUE EXAM BY PATHOLOGIST: CPT | Mod: 26 | Performed by: PATHOLOGY

## 2022-03-09 PROCEDURE — 99203 OFFICE O/P NEW LOW 30 MIN: CPT | Mod: 25 | Performed by: COLON & RECTAL SURGERY

## 2022-03-09 ASSESSMENT — PAIN SCALES - GENERAL: PAINLEVEL: NO PAIN (0)

## 2022-03-09 NOTE — LETTER
Date:March 12, 2022      Patient was self referred, no letter generated. Do not send.        [facility] Health Information

## 2022-03-09 NOTE — LETTER
3/9/2022       RE: Justin Cortez  [address] [place] [address]     Dear Colleague,    Thank you for referring your patient, Justin Cortez, to the [facility] COLON AND RECTAL SURGERY CLINIC [place] at [facility]. Please see a copy of my visit note below.    Colon and Rectal Surgery Consult Clinic Note    Referring provider:  No referring provider defined for this encounter.       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 Mota here for pelvic pouch surveillance. I saw him last on 2/15/2017.    Interval History:  Justin has been doing well. No issues with his pouch or bowel function. He feels like he has a hemorrhoid that bulges out with bowl movements but no pain or bleeding.    PLEASE SEE NOTE BELOW FOR PHYSICAL EXAMINATION, REVIEW OF SYSTEMS, AND OTHER HISTORY.    Assessment/Plan:    46 year old male with a significant past medical history of ulcerative colitis and restorative total proctocolectomy with ileal pouch anal anastomosis here for pelvic pouch surveillance. Surveillance today with flexible pouchoscopy. I biopsied the anal transition zone. We will let him know what this shows and likely recommend repeat in 1 to 2 years. Overall, everything appears health and normal.     5 minutes spent on the date of encounter (excluding time performing procedures with or without biopsy) performing chart review, history and exam, documentation and further activities as noted above with an additional 10 minutes for flexible sigmoidoscopy.    Zeny Rodriguez MD  Colon and Rectal Surgery Staff  [facility]      -------------------------------------------------------------------------------------------------------------------          Medical history:  Past Medical History:   Diagnosis Date     Obstructive sleep apnea      Ulcerative colitis (H)     treated with TPC with IPAA in 2000       Surgical history:  Past Surgical History:   Procedure Laterality Date     Total proctocolectomy with ileal pouch anal anastomosis with pelvic ileal pouch.  2000       Problem list:    Patient Active Problem List    Diagnosis Date Noted     PETE (obstructive sleep apnea) 07/10/2015     Priority: Medium     very mild, positional PETE, overall AHI 5.3/hr         Medications:  Current Outpatient Medications   Medication Sig Dispense Refill     Sertraline HCl (ZOLOFT PO) Take 100 mg by mouth daily         Allergies:  No Known Allergies    Family history:  Family History   Problem Relation Age of Onset     Diabetes No family hx of      Hypertension No family hx of      Coronary Artery Disease No family hx of      Cerebrovascular Disease No family hx of        Social history:  Social History     Socioeconomic History     Marital status:      Spouse name: Not on file     Number of children: Not on file     Years of education: Not on file     Highest education level: Not on file   Occupational History     Not on file   Tobacco Use     Smoking status: Never Smoker     Smokeless tobacco: Never Used   Substance and Sexual Activity     Alcohol use: Yes     Comment: a few times per year     Drug use: No     Sexual activity: Not on file   Other Topics Concern     Not on file   Social History Narrative     Not on file     Social Determinants of Health     Financial Resource Strain: Not on file   Food Insecurity: Not on file   Transportation Needs: Not on file   Physical Activity: Not on file   Stress: Not on file   Social Connections: Not on file   Intimate Partner Violence: Not on file   Housing Stability: Not on file         Nursing  "Notes:   Mariana Ruiz  3/9/2022  3:26 PM  Signed  Chief Complaint   Patient presents with     Flexible Sigmoidoscopy       Vitals:    03/09/22 1523   BP: 123/75   BP Location: Left arm   Patient Position: Sitting   Cuff Size: Adult Regular   Pulse: 72   SpO2: 99%   Weight: 237 lb 9.6 oz   Height: 6' 5\"       Body mass index is 28.18 kg/m .    Mariana Ruiz CMA         Physical Examination:  /75 (BP Location: Left arm, Patient Position: Sitting, Cuff Size: Adult Regular)   Pulse 72   Ht 1.956 m (6' 5\")   Wt 107.8 kg (237 lb 9.6 oz)   SpO2 99%   BMI 28.18 kg/m    General: Alert, oriented, in no acute distress    Perianal external examination:  Small skin tag in the posterior position     Digital rectal examination: Good sphincter tone, prostate normal. Ileal pouch anal anastomosis is open.      Flexible pouchoscopy: After informed consent obtained, flexible pouchoscopy was performed. The pouch overall was healthy appearing with possibly a small amount of subtle mucosal inflammation. I took several biopsies of the anal transition zone, which overall was healthy in appearance. He has what looks like a hypertrophied anal papilla that may prolapse out but no obvious internal hemorrhoids. The patient tolerated the procedure well.             Again, thank you for allowing me to participate in the care of your patient.      Sincerely,    Zeny Rodriguez MD      "

## 2022-03-09 NOTE — NURSING NOTE
"Chief Complaint   Patient presents with     Flexible Sigmoidoscopy       Vitals:    03/09/22 1523   BP: 123/75   BP Location: Left arm   Patient Position: Sitting   Cuff Size: Adult Regular   Pulse: 72   SpO2: 99%   Weight: 237 lb 9.6 oz   Height: 6' 5\"       Body mass index is 28.18 kg/m .    Mariana Ruiz CMA    "

## 2022-03-13 LAB
PATH REPORT.COMMENTS IMP SPEC: NORMAL
PATH REPORT.COMMENTS IMP SPEC: NORMAL
PATH REPORT.FINAL DX SPEC: NORMAL
PATH REPORT.GROSS SPEC: NORMAL
PATH REPORT.MICROSCOPIC SPEC OTHER STN: NORMAL
PATH REPORT.RELEVANT HX SPEC: NORMAL
PHOTO IMAGE: NORMAL

## 2022-05-15 ENCOUNTER — HEALTH MAINTENANCE LETTER (OUTPATIENT)
Age: 47
End: 2022-05-15

## 2022-09-11 ENCOUNTER — HEALTH MAINTENANCE LETTER (OUTPATIENT)
Age: 47
End: 2022-09-11

## 2023-06-03 ENCOUNTER — HEALTH MAINTENANCE LETTER (OUTPATIENT)
Age: 48
End: 2023-06-03

## 2024-07-07 ENCOUNTER — HEALTH MAINTENANCE LETTER (OUTPATIENT)
Age: 49
End: 2024-07-07

## 2024-12-04 ENCOUNTER — TELEPHONE (OUTPATIENT)
Dept: SURGERY | Facility: CLINIC | Age: 49
End: 2024-12-04
Payer: COMMERCIAL

## 2025-02-23 NOTE — PROGRESS NOTES
Colon and Rectal Surgery Follow-Up Clinic Note    RE: Justin Cortez  : 1975  [place]: 2025      Justin Cortez is a very pleasant 49 year old male with history of ulcerative colitis and restorative total proctocolectomy with ileal pouch anal anastomosis in  by Dr. Mota here for surveillance pouchoscopy. I last saw him on 3/9/2022.    Last pouchoscopy 3/9/2022: The pouch overall was healthy appearing with possibly a small amount of subtle mucosal inflammation. I took several biopsies of the anal transition zone, which overall was healthy in appearance. He has what looks like a hypertrophied anal papilla that may prolapse out but no obvious internal hemorrhoids.   Final Diagnosis   A. ANAL TRANSITION ZONE BIOPSY:  -Intestinal mucosa with mild focally active chronic inflammation   -Negative for dysplasia        Interval history: overall he is doing well. Function is unchanged. This is most typically 5-8 bowel movements during the day and getting up once per night. Very rarely he will have a short period of time where he will have overnight fecal incontinence of a small amount then it will go back to baseline. Again, this is no different then baseline since getting the pouch. His 3 children are doing well.    PLEASE SEE NOTE BELOW FOR PHYSICAL EXAMINATION, REVIEW OF SYSTEMS, AND OTHER HISTORY.    Assessment/Plan: 49 year old male with history of ulcerative colitis and restorative total proctocolectomy with ileal pouch anal anastomosis in  by Dr. Mota here for surveillance pouchoscopy, which was completed today. I biopsied the anal transition zone. We will let him know what this shows and likely recommend repeat in 1 year. We will let him know the results of his pathology. Overall, everything appears healthy. Contact us in the meantime with questions or concerns. Patient's questions were answered to his stated satisfaction and he is in agreement with this plan.       I spent a total of 10  minutes on the day of the visit.  This includes time spent on date of encounter doing chart review, history and exam, documentation, and further activities in this note. An additional 10 minutes was spent for flexible pouchoscopy.    Zeny Rodriguez MD  Colon and Rectal Surgery Staff  [facility]      Medical history:  Past Medical History:   Diagnosis Date    Obstructive sleep apnea     Ulcerative colitis (H)     treated with TPC with IPAA in 2000       Surgical history:  Past Surgical History:   Procedure Laterality Date    Total proctocolectomy with ileal pouch anal anastomosis with pelvic ileal pouch.  2000       Problem list:    Patient Active Problem List    Diagnosis Date Noted    PETE (obstructive sleep apnea) 07/10/2015     Priority: Medium     very mild, positional PETE, overall AHI 5.3/hr         Medications:  Current Outpatient Medications   Medication Sig Dispense Refill    Sertraline HCl (ZOLOFT PO) Take 100 mg by mouth daily         Allergies:  No Known Allergies    Family history:  Family History   Problem Relation Age of Onset    Diabetes No family hx of     Hypertension No family hx of     Coronary Artery Disease No family hx of     Cerebrovascular Disease No family hx of        Social history:  Social History     Socioeconomic History    Marital status:      Spouse name: Not on file    Number of children: Not on file    Years of education: Not on file    Highest education level: Not on file   Occupational History    Not on file   Tobacco Use    Smoking status: Never    Smokeless tobacco: Never   Substance and Sexual Activity    Alcohol use: Yes     Comment: a few times per year    Drug use: No    Sexual activity: Not on file   Other Topics Concern    Not on file   Social History Narrative    Not on file     Social Drivers of Health     Financial Resource Strain: Not on file   Food Insecurity: No Food Insecurity (11/26/2024)    Received from [employer]     Hunger Vital Sign     Worried About Running Out of Food in the Last Year: Never true     Ran Out of Food in the Last Year: Never true   Transportation Needs: No Transportation Needs (11/26/2024)    Received from [employer]    PRAPARE - Transportation     Lack of Transportation (Medical): No     Lack of Transportation (Non-Medical): No   Physical Activity: Not on file   Stress: Not on file   Social Connections: Not on file   Interpersonal Safety: Not on file   Housing Stability: Low Risk  (11/26/2024)    Received from [employer]    Housing Stability Vital Sign     Unable to Pay for Housing in the Last Year: No     Number of Times Moved in the Last Year: 0     Homeless in the Last Year: No       Physical Examination:  /79 (BP Location: Left arm, Patient Position: Sitting, Cuff Size: Adult Regular)   Pulse 103   SpO2 99%   General: alert, oriented, in no acute distress, sitting comfortably  Respiratory: non-labored breathing on RA  Chaperone: Kris Smith EMT-P   Perianal External Examination:  Small skin tag in the posterior position     Digital rectal examination: Was performed.   Good sphincter tone, prostate normal. Ileal pouch anal anastomosis is patent.    Anoscopy: Was deferred.    Procedure: Flexible Pouchoscopy  After discussing the risks and benefits, the patient agreed to proceed with flexible pouchoscopy.    Prior to the start of the procedure and with procedural staff participation, I verbally confirmed the patient s identity using two indicators, relevant allergies, that the procedure was appropriate and matched the consent or emergent situation, and that the correct equipment/implants were available. Immediately prior to starting the procedure I conducted the Time Out with the procedural staff and re-confirmed the patient s name, procedure, and site/side. (The Joint Commission universal protocol was followed.)  Yes    The patient administered  fleet enema(s) on their own for a  preparation.    The sigmoidoscope was inserted to the pouch. The pouch overall was healthy appearing with possibly a small amount of subtle mucosal inflammation. The anal transition zone also appeared healthy and several biopsies were taken of the body and the anal transition zone. The patient tolerated the procedure well.

## 2025-02-26 ENCOUNTER — OFFICE VISIT (OUTPATIENT)
Dept: SURGERY | Facility: CLINIC | Age: 50
End: 2025-02-26
Payer: COMMERCIAL

## 2025-02-26 VITALS — SYSTOLIC BLOOD PRESSURE: 119 MMHG | HEART RATE: 103 BPM | OXYGEN SATURATION: 99 % | DIASTOLIC BLOOD PRESSURE: 79 MMHG

## 2025-02-26 DIAGNOSIS — K51.019 ULCERATIVE PANCOLITIS WITH COMPLICATION (H): Primary | ICD-10-CM

## 2025-02-26 PROCEDURE — 88305 TISSUE EXAM BY PATHOLOGIST: CPT | Mod: 26 | Performed by: PATHOLOGY

## 2025-02-26 PROCEDURE — 88305 TISSUE EXAM BY PATHOLOGIST: CPT | Mod: TC | Performed by: COLON & RECTAL SURGERY

## 2025-02-26 ASSESSMENT — PAIN SCALES - GENERAL: PAINLEVEL_OUTOF10: NO PAIN (0)

## 2025-02-26 NOTE — LETTER
2025       RE: Justin Cortez  [address]  [place] [address]     Dear Colleague,    Thank you for referring your patient, Justin Cortez, to the [facility] COLON AND RECTAL SURGERY CLINIC [place] at [facility]. Please see a copy of my visit note below.    Colon and Rectal Surgery Follow-Up Clinic Note    RE: Justin Cortez  : 1975  [place]: 2025      Justin Cortez is a very pleasant 49 year old male with history of ulcerative colitis and restorative total proctocolectomy with ileal pouch anal anastomosis in  by Dr. Mota here for surveillance pouchoscopy. I last saw him on 3/9/2022.    Last pouchoscopy 3/9/2022: The pouch overall was healthy appearing with possibly a small amount of subtle mucosal inflammation. I took several biopsies of the anal transition zone, which overall was healthy in appearance. He has what looks like a hypertrophied anal papilla that may prolapse out but no obvious internal hemorrhoids.   Final Diagnosis   A. ANAL TRANSITION ZONE BIOPSY:  -Intestinal mucosa with mild focally active chronic inflammation   -Negative for dysplasia        Interval history: overall he is doing well. Function is unchanged. This is most typically 5-8 bowel movements during the day and getting up once per night. Very rarely he will have a short period of time where he will have overnight fecal incontinence of a small amount then it will go back to baseline. Again, this is no different then baseline since getting the pouch. His 3 children are doing well.    PLEASE SEE NOTE BELOW FOR PHYSICAL EXAMINATION, REVIEW OF SYSTEMS, AND OTHER HISTORY.    Assessment/Plan: 49 year old male with history of ulcerative colitis and restorative total proctocolectomy with ileal pouch anal anastomosis in  by Dr. Mota here for surveillance pouchoscopy, which was completed today. I biopsied the anal transition zone. We will let  him know what this shows and likely recommend repeat in 1 year. We will let him know the results of his pathology. Overall, everything appears healthy. Contact us in the meantime with questions or concerns. Patient's questions were answered to his stated satisfaction and he is in agreement with this plan.       I spent a total of 10 minutes on the day of the visit.  This includes time spent on date of encounter doing chart review, history and exam, documentation, and further activities in this note. An additional 10 minutes was spent for flexible pouchoscopy.    Zeny Rodriguez MD  Colon and Rectal Surgery Staff  [facility]      Medical history:  Past Medical History:   Diagnosis Date     Obstructive sleep apnea      Ulcerative colitis (H)     treated with TPC with IPAA in 2000       Surgical history:  Past Surgical History:   Procedure Laterality Date     Total proctocolectomy with ileal pouch anal anastomosis with pelvic ileal pouch.  2000       Problem list:    Patient Active Problem List    Diagnosis Date Noted     PETE (obstructive sleep apnea) 07/10/2015     Priority: Medium     very mild, positional PETE, overall AHI 5.3/hr         Medications:  Current Outpatient Medications   Medication Sig Dispense Refill     Sertraline HCl (ZOLOFT PO) Take 100 mg by mouth daily         Allergies:  No Known Allergies    Family history:  Family History   Problem Relation Age of Onset     Diabetes No family hx of      Hypertension No family hx of      Coronary Artery Disease No family hx of      Cerebrovascular Disease No family hx of        Social history:  Social History     Socioeconomic History     Marital status:      Spouse name: Not on file     Number of children: Not on file     Years of education: Not on file     Highest education level: Not on file   Occupational History     Not on file   Tobacco Use     Smoking status: Never     Smokeless tobacco: Never   Substance and  Sexual Activity     Alcohol use: Yes     Comment: a few times per year     Drug use: No     Sexual activity: Not on file   Other Topics Concern     Not on file   Social History Narrative     Not on file     Social Drivers of Health     Financial Resource Strain: Not on file   Food Insecurity: No Food Insecurity (11/26/2024)    Received from [employer]    Hunger Vital Sign      Worried About Running Out of Food in the Last Year: Never true      Ran Out of Food in the Last Year: Never true   Transportation Needs: No Transportation Needs (11/26/2024)    Received from [employer]    PRAPARE - Transportation      Lack of Transportation (Medical): No      Lack of Transportation (Non-Medical): No   Physical Activity: Not on file   Stress: Not on file   Social Connections: Not on file   Interpersonal Safety: Not on file   Housing Stability: Low Risk  (11/26/2024)    Received from [employer]    Housing Stability Vital Sign      Unable to Pay for Housing in the Last Year: No      Number of Times Moved in the Last Year: 0      Homeless in the Last Year: No       Physical Examination:  /79 (BP Location: Left arm, Patient Position: Sitting, Cuff Size: Adult Regular)   Pulse 103   SpO2 99%   General: alert, oriented, in no acute distress, sitting comfortably  Respiratory: non-labored breathing on RA  Chaperone: EMILY Headley-P   Perianal External Examination:  Small skin tag in the posterior position     Digital rectal examination: Was performed.   Good sphincter tone, prostate normal. Ileal pouch anal anastomosis is patent.    Anoscopy: Was deferred.    Procedure: Flexible Pouchoscopy  After discussing the risks and benefits, the patient agreed to proceed with flexible pouchoscopy.    Prior to the start of the procedure and with procedural staff participation, I verbally confirmed the patient s identity using two indicators, relevant allergies, that the procedure was appropriate and matched the consent or  emergent situation, and that the correct equipment/implants were available. Immediately prior to starting the procedure I conducted the Time Out with the procedural staff and re-confirmed the patient s name, procedure, and site/side. (The Joint Commission universal protocol was followed.)  Yes    The patient administered  fleet enema(s) on their own for a preparation.    The sigmoidoscope was inserted to the pouch. The pouch overall was healthy appearing with possibly a small amount of subtle mucosal inflammation. The anal transition zone also appeared healthy and several biopsies were taken of the body and the anal transition zone. The patient tolerated the procedure well.       Again, thank you for allowing me to participate in the care of your patient.      Sincerely,    Zeny Rodriguez MD

## 2025-02-26 NOTE — PATIENT INSTRUCTIONS
We will follow up with you on results, recommend following up in one year for surveillance    Sarina RN: [phone]    Colorectal Surgery Clinic: [phone]

## 2025-02-26 NOTE — NURSING NOTE
Chief Complaint   Patient presents with    Flexible Sigmoidoscopy       Vitals:    02/26/25 1624   BP: 119/79   BP Location: Left arm   Patient Position: Sitting   Cuff Size: Adult Regular   Pulse: 103   SpO2: 99%       There is no height or weight on file to calculate BMI.    Kris Smith EMT-P

## 2025-07-19 ENCOUNTER — HEALTH MAINTENANCE LETTER (OUTPATIENT)
Age: 50
End: 2025-07-19